# Patient Record
Sex: MALE | HISPANIC OR LATINO | Employment: UNEMPLOYED | URBAN - METROPOLITAN AREA
[De-identification: names, ages, dates, MRNs, and addresses within clinical notes are randomized per-mention and may not be internally consistent; named-entity substitution may affect disease eponyms.]

---

## 2024-02-22 ENCOUNTER — HOSPITAL ENCOUNTER (EMERGENCY)
Facility: CLINIC | Age: 29
Discharge: HOME | End: 2024-02-22
Attending: EMERGENCY MEDICINE | Admitting: EMERGENCY MEDICINE

## 2024-02-22 VITALS
RESPIRATION RATE: 18 BRPM | TEMPERATURE: 98.3 F | WEIGHT: 128.75 LBS | DIASTOLIC BLOOD PRESSURE: 82 MMHG | OXYGEN SATURATION: 100 % | HEART RATE: 71 BPM | SYSTOLIC BLOOD PRESSURE: 138 MMHG

## 2024-02-22 DIAGNOSIS — L03.211 FACIAL CELLULITIS: ICD-10-CM

## 2024-02-22 DIAGNOSIS — L08.9 PUSTULAR LESION: ICD-10-CM

## 2024-02-22 PROCEDURE — 99284 EMERGENCY DEPT VISIT MOD MDM: CPT

## 2024-02-22 RX ORDER — CLINDAMYCIN PHOSPHATE 10 UG/ML
LOTION TOPICAL 2 TIMES DAILY PRN
Qty: 60 ML | Refills: 0 | Status: SHIPPED | OUTPATIENT
Start: 2024-02-22

## 2024-02-22 RX ORDER — CEPHALEXIN 500 MG/1
500 CAPSULE ORAL 4 TIMES DAILY
Qty: 28 CAPSULE | Refills: 0 | Status: SHIPPED | OUTPATIENT
Start: 2024-02-22 | End: 2024-02-29

## 2024-02-22 RX ORDER — CLINDAMYCIN PHOSPHATE 10 UG/ML
LOTION TOPICAL 2 TIMES DAILY PRN
Status: DISCONTINUED | OUTPATIENT
Start: 2024-02-22 | End: 2024-02-22

## 2024-02-22 RX ORDER — DOXYCYCLINE 100 MG/1
100 CAPSULE ORAL 2 TIMES DAILY
Qty: 14 CAPSULE | Refills: 0 | Status: SHIPPED | OUTPATIENT
Start: 2024-02-22 | End: 2024-02-29

## 2024-02-22 NOTE — ED TRIAGE NOTES
"Pt presents to ED with c/o left sided facial swelling x 4 days. Pt alert and ambulatory. Pt states that he had some \"granules or blemishes\" on his arms and faceat one point.      used for triage.        "

## 2024-02-22 NOTE — ED PROVIDER NOTES
History     Chief Complaint:  Facial swelling     HPI   Michael Enciso is a 28 year old male who presents with a few days of left facial swelling as well as intermittent pustules on his body over the recent past. No vision change or fever. 3 days ago left cheek region seemed to start swelling after noticing a pustular lesion there. Yesterday pt noticed some blood/pus discharge from the left cheeks. Pt first started noticing the lesions 3 months ago. Sx persisted despite changing cities and living environment.     Professional  was used.       Independent Historian:   None - Patient Only        Medications:    The patient is currently on no regular medications.    Past Medical History:    The patient has no pertinent medical history    Past Surgical History:    No patient has no pertinent surgical history     Physical Exam   Patient Vitals for the past 24 hrs:   BP Temp Pulse Resp SpO2 Weight   02/22/24 1144 138/82 -- 71 18 100 % --   02/22/24 0850 (!) 151/107 98.3  F (36.8  C) 77 16 98 % --   02/22/24 0848 -- -- -- -- -- 58.4 kg (128 lb 12 oz)        Physical Exam  VS: Reviewed per above  HENT: Mucous membranes moist, left maxillary edema without underlying fluctuance.  No open wounds to the left face.  EYES: sclera anicteric, PERRL, no proptosis, no periorbital edema.  CV: Rate as noted  RESP: Effort normal.   NEURO: Alert, moving all extremities  MSK: No deformity of the extremities  SKIN: Warm and dry  Emergency Department Course     Emergency Department Course & Assessments:      Disposition:  The patient was discharged.     Impression & Plan      Medical Decision Making:  Patient presents to the ER for evaluation of a few days of left facial swelling in the setting of subacute intermittent pustular lesions over his body.  Vital signs reassuring.  On exam he has cellulitic change but no clear underlying abscess.  No clinical signs of  preseptal or orbital cellulitis.  I will prescribe  antibiotics as well as clindamycin cream to use for any other lesions that he develops.  Discussed using antibacterial soap to help treat these pustular vs folliculitis lesions before they worsen.  Primary care follow-up recommended.  Return precautions discussed.      Diagnosis:    ICD-10-CM    1. Facial cellulitis  L03.211       2. Pustular lesion  L08.9            Discharge Medications:  Discharge Medication List as of 2/22/2024 11:22 AM        START taking these medications    Details   cephALEXin (KEFLEX) 500 MG capsule Take 1 capsule (500 mg) by mouth 4 times daily for 7 days, Disp-28 capsule, R-0, Local Print      clindamycin (CLEOCIN T) 1 % external lotion Apply topically 2 times daily as needed (apply to pustular lesions)Disp-60 mL, R-0Local Print      doxycycline hyclate (VIBRAMYCIN) 100 MG capsule Take 1 capsule (100 mg) by mouth 2 times daily for 7 days, Disp-14 capsule, R-0, Local Print              2/22/2024   Jakub Valle MD Lindenbaum, Elan, MD  02/22/24 2624

## 2024-04-04 ENCOUNTER — APPOINTMENT (OUTPATIENT)
Dept: ADMINISTRATIVE | Facility: CLINIC | Age: 29
End: 2024-04-04